# Patient Record
Sex: FEMALE | Race: BLACK OR AFRICAN AMERICAN | NOT HISPANIC OR LATINO | Employment: UNEMPLOYED | URBAN - METROPOLITAN AREA
[De-identification: names, ages, dates, MRNs, and addresses within clinical notes are randomized per-mention and may not be internally consistent; named-entity substitution may affect disease eponyms.]

---

## 2024-04-23 ENCOUNTER — HOSPITAL ENCOUNTER (EMERGENCY)
Facility: HOSPITAL | Age: 54
Discharge: HOME | End: 2024-04-23
Attending: EMERGENCY MEDICINE
Payer: MEDICAID

## 2024-04-23 VITALS
HEART RATE: 64 BPM | WEIGHT: 240 LBS | SYSTOLIC BLOOD PRESSURE: 136 MMHG | DIASTOLIC BLOOD PRESSURE: 96 MMHG | OXYGEN SATURATION: 99 % | RESPIRATION RATE: 16 BRPM | TEMPERATURE: 99 F | BODY MASS INDEX: 42.52 KG/M2 | HEIGHT: 63 IN

## 2024-04-23 DIAGNOSIS — M51.36 DDD (DEGENERATIVE DISC DISEASE), LUMBAR: ICD-10-CM

## 2024-04-23 DIAGNOSIS — M54.16 LUMBAR RADICULOPATHY: Primary | ICD-10-CM

## 2024-04-23 DIAGNOSIS — R03.0 ELEVATED BLOOD PRESSURE READING: ICD-10-CM

## 2024-04-23 LAB
ALBUMIN SERPL-MCNC: 3.5 G/DL (ref 3.5–5)
ALBUMIN/GLOB SERPL: 0.7 RATIO (ref 1.1–2)
ALP SERPL-CCNC: 96 UNIT/L (ref 40–150)
ALT SERPL-CCNC: 12 UNIT/L (ref 0–55)
APPEARANCE UR: CLEAR
AST SERPL-CCNC: 18 UNIT/L (ref 5–34)
BACTERIA #/AREA URNS AUTO: ABNORMAL /HPF
BASOPHILS # BLD AUTO: 0.04 X10(3)/MCL
BASOPHILS NFR BLD AUTO: 0.6 %
BILIRUB SERPL-MCNC: 0.2 MG/DL
BILIRUB UR QL STRIP.AUTO: NEGATIVE
BUN SERPL-MCNC: 12.3 MG/DL (ref 9.8–20.1)
CALCIUM SERPL-MCNC: 9.5 MG/DL (ref 8.4–10.2)
CHLORIDE SERPL-SCNC: 108 MMOL/L (ref 98–107)
CO2 SERPL-SCNC: 28 MMOL/L (ref 22–29)
COLOR UR AUTO: YELLOW
CREAT SERPL-MCNC: 0.69 MG/DL (ref 0.55–1.02)
EOSINOPHIL # BLD AUTO: 0.08 X10(3)/MCL (ref 0–0.9)
EOSINOPHIL NFR BLD AUTO: 1.2 %
ERYTHROCYTE [DISTWIDTH] IN BLOOD BY AUTOMATED COUNT: 20.4 % (ref 11.5–17)
GFR SERPLBLD CREATININE-BSD FMLA CKD-EPI: >60 MLS/MIN/1.73/M2
GLOBULIN SER-MCNC: 4.7 GM/DL (ref 2.4–3.5)
GLUCOSE SERPL-MCNC: 91 MG/DL (ref 74–100)
GLUCOSE UR QL STRIP.AUTO: NORMAL
HCT VFR BLD AUTO: 35.9 % (ref 37–47)
HGB BLD-MCNC: 10 G/DL (ref 12–16)
IMM GRANULOCYTES # BLD AUTO: 0.02 X10(3)/MCL (ref 0–0.04)
IMM GRANULOCYTES NFR BLD AUTO: 0.3 %
KETONES UR QL STRIP.AUTO: NEGATIVE
LEUKOCYTE ESTERASE UR QL STRIP.AUTO: NEGATIVE
LYMPHOCYTES # BLD AUTO: 2.07 X10(3)/MCL (ref 0.6–4.6)
LYMPHOCYTES NFR BLD AUTO: 30.6 %
MCH RBC QN AUTO: 20.7 PG (ref 27–31)
MCHC RBC AUTO-ENTMCNC: 27.9 G/DL (ref 33–36)
MCV RBC AUTO: 74.2 FL (ref 80–94)
MONOCYTES # BLD AUTO: 0.5 X10(3)/MCL (ref 0.1–1.3)
MONOCYTES NFR BLD AUTO: 7.4 %
MUCOUS THREADS URNS QL MICRO: ABNORMAL /LPF
NEUTROPHILS # BLD AUTO: 4.05 X10(3)/MCL (ref 2.1–9.2)
NEUTROPHILS NFR BLD AUTO: 59.9 %
NITRITE UR QL STRIP.AUTO: NEGATIVE
NRBC BLD AUTO-RTO: 0 %
PH UR STRIP.AUTO: 6.5 [PH]
PLATELET # BLD AUTO: 356 X10(3)/MCL (ref 130–400)
PMV BLD AUTO: 9.8 FL (ref 7.4–10.4)
POTASSIUM SERPL-SCNC: 3.7 MMOL/L (ref 3.5–5.1)
PROT SERPL-MCNC: 8.2 GM/DL (ref 6.4–8.3)
PROT UR QL STRIP.AUTO: ABNORMAL
RBC # BLD AUTO: 4.84 X10(6)/MCL (ref 4.2–5.4)
RBC #/AREA URNS AUTO: ABNORMAL /HPF
RBC UR QL AUTO: NEGATIVE
SODIUM SERPL-SCNC: 145 MMOL/L (ref 136–145)
SP GR UR STRIP.AUTO: 1.02 (ref 1–1.03)
SQUAMOUS #/AREA URNS LPF: ABNORMAL /HPF
UROBILINOGEN UR STRIP-ACNC: NORMAL
WBC # SPEC AUTO: 6.76 X10(3)/MCL (ref 4.5–11.5)
WBC #/AREA URNS AUTO: ABNORMAL /HPF

## 2024-04-23 PROCEDURE — 85025 COMPLETE CBC W/AUTO DIFF WBC: CPT | Performed by: NURSE PRACTITIONER

## 2024-04-23 PROCEDURE — 96372 THER/PROPH/DIAG INJ SC/IM: CPT

## 2024-04-23 PROCEDURE — 81001 URINALYSIS AUTO W/SCOPE: CPT | Performed by: NURSE PRACTITIONER

## 2024-04-23 PROCEDURE — 80053 COMPREHEN METABOLIC PANEL: CPT | Performed by: NURSE PRACTITIONER

## 2024-04-23 PROCEDURE — 99285 EMERGENCY DEPT VISIT HI MDM: CPT | Mod: 25

## 2024-04-23 PROCEDURE — 63600175 PHARM REV CODE 636 W HCPCS

## 2024-04-23 PROCEDURE — 25000003 PHARM REV CODE 250

## 2024-04-23 RX ORDER — TRAMADOL HYDROCHLORIDE 50 MG/1
50 TABLET ORAL EVERY 6 HOURS PRN
Qty: 12 TABLET | Refills: 0 | Status: SHIPPED | OUTPATIENT
Start: 2024-04-23 | End: 2024-04-26

## 2024-04-23 RX ORDER — AMLODIPINE BESYLATE 5 MG/1
5 TABLET ORAL DAILY
Qty: 30 TABLET | Refills: 0 | Status: SHIPPED | OUTPATIENT
Start: 2024-04-23 | End: 2024-05-23

## 2024-04-23 RX ORDER — CLONIDINE HYDROCHLORIDE 0.1 MG/1
0.1 TABLET ORAL
Status: COMPLETED | OUTPATIENT
Start: 2024-04-23 | End: 2024-04-23

## 2024-04-23 RX ORDER — HYDROCODONE BITARTRATE AND ACETAMINOPHEN 5; 325 MG/1; MG/1
1 TABLET ORAL
Status: COMPLETED | OUTPATIENT
Start: 2024-04-23 | End: 2024-04-23

## 2024-04-23 RX ORDER — DEXAMETHASONE SODIUM PHOSPHATE 4 MG/ML
8 INJECTION, SOLUTION INTRA-ARTICULAR; INTRALESIONAL; INTRAMUSCULAR; INTRAVENOUS; SOFT TISSUE
Status: COMPLETED | OUTPATIENT
Start: 2024-04-23 | End: 2024-04-23

## 2024-04-23 RX ADMIN — HYDROCODONE BITARTRATE AND ACETAMINOPHEN 1 TABLET: 5; 325 TABLET ORAL at 11:04

## 2024-04-23 RX ADMIN — DEXAMETHASONE SODIUM PHOSPHATE 8 MG: 4 INJECTION, SOLUTION INTRA-ARTICULAR; INTRALESIONAL; INTRAMUSCULAR; INTRAVENOUS; SOFT TISSUE at 11:04

## 2024-04-23 RX ADMIN — CLONIDINE HYDROCHLORIDE 0.1 MG: 0.1 TABLET ORAL at 11:04

## 2024-04-23 NOTE — ED PROVIDER NOTES
Encounter Date: 4/23/2024       History     Chief Complaint   Patient presents with    Leg Pain     Ambulatory to triage. Reports right sided lower back pain radiating to right buttock and down leg. States burning 9/10. Denies trauma or injury. Also reports urinary frequency x 3 weeks, denies dysuria/fever. States no PCP, no PMH of sciatica or HTN. Not on meds for anything per pt. Denies headache, blurred vision, dizziness.     The patient is a 54 y.o. female who presents to the Emergency Department with a chief complaint of right lower back pain. Symptoms began 3 weeks ago and have been constant since onset. Her  pain is currently rated as a 8/10 in severity and described as sharp with radiation down right leg. Associated symptoms include nothing. Symptoms are aggravated with ambulation and there are no alleviating factors. The patient denies saddle anesthesia, bowel/bladder incontinence, or extremity weakness. She reports taking nothing prior to arrival with no relief of symptoms. No other reported symptoms at this time.      The history is provided by the patient. No  was used.   Back Pain   This is a new problem. The current episode started several weeks ago. The problem occurs daily. The problem has been unchanged. The pain is associated with no known injury. The pain is present in the lumbar spine. The quality of the pain is described as aching. The pain radiates to the right leg. The pain is at a severity of 8/10. The pain is The same all the time. Pertinent negatives include no chest pain, no fever, no numbness, no weight loss, no headaches, no abdominal pain, no abdominal swelling, no bowel incontinence, no perianal numbness, no bladder incontinence, no dysuria, no pelvic pain, no leg pain, no paresthesias, no paresis, no tingling and no weakness. She has tried nothing for the symptoms. The treatment provided no relief.     Review of patient's allergies indicates:  No Known Allergies  No  past medical history on file.  No past surgical history on file.  No family history on file.  Social History     Tobacco Use    Smoking status: Never    Smokeless tobacco: Never     Review of Systems   Constitutional:  Negative for chills, fever and weight loss.   HENT:  Negative for sore throat.    Respiratory:  Negative for shortness of breath.    Cardiovascular:  Negative for chest pain.   Gastrointestinal:  Negative for abdominal pain, bowel incontinence and nausea.   Genitourinary:  Negative for bladder incontinence, dysuria and pelvic pain.   Musculoskeletal:  Positive for arthralgias and back pain.   Skin:  Negative for rash.   Neurological:  Negative for tingling, weakness, numbness, headaches and paresthesias.   Hematological:  Does not bruise/bleed easily.   All other systems reviewed and are negative.      Physical Exam     Initial Vitals [04/23/24 0908]   BP Pulse Resp Temp SpO2   (!) 205/103 81 18 98.6 °F (37 °C) 100 %      MAP       --         Physical Exam    Nursing note and vitals reviewed.  Constitutional: She appears well-developed and well-nourished.   HENT:   Head: Normocephalic.   Right Ear: Hearing and tympanic membrane normal.   Left Ear: Hearing and tympanic membrane normal.   Mouth/Throat: Uvula is midline, oropharynx is clear and moist and mucous membranes are normal.   Eyes: Conjunctivae and EOM are normal. Pupils are equal, round, and reactive to light.   Cardiovascular:  Normal rate, regular rhythm, normal heart sounds and normal pulses.           Pulmonary/Chest: Effort normal and breath sounds normal.   Abdominal: Abdomen is soft. Bowel sounds are normal. There is no abdominal tenderness.   Musculoskeletal:      Lumbar back: Tenderness present. Positive right straight leg raise test. Negative left straight leg raise test.        Legs:      Lymphadenopathy:     She has no cervical adenopathy.   Neurological: She is alert. GCS eye subscore is 4. GCS verbal subscore is 5. GCS motor  subscore is 6.   Skin: Skin is warm, dry and intact. Capillary refill takes less than 2 seconds.         ED Course   Procedures  Labs Reviewed   URINALYSIS, REFLEX TO URINE CULTURE - Abnormal; Notable for the following components:       Result Value    Protein, UA 1+ (*)     Mucous, UA Trace (*)     All other components within normal limits   COMPREHENSIVE METABOLIC PANEL - Abnormal; Notable for the following components:    Chloride 108 (*)     Globulin 4.7 (*)     Albumin/Globulin Ratio 0.7 (*)     All other components within normal limits   CBC WITH DIFFERENTIAL - Abnormal; Notable for the following components:    Hgb 10.0 (*)     Hct 35.9 (*)     MCV 74.2 (*)     MCH 20.7 (*)     MCHC 27.9 (*)     RDW 20.4 (*)     All other components within normal limits   CBC W/ AUTO DIFFERENTIAL    Narrative:     The following orders were created for panel order CBC Auto Differential.  Procedure                               Abnormality         Status                     ---------                               -----------         ------                     CBC with Differential[[id number]]       Abnormal            Final result                 Please view results for these tests on the individual orders.          Imaging Results              CT Lumbar Spine Without Contrast (Final result)  Result time 04/23/24 11:58:31      Final result by Samira Montenegro MD (04/23/24 11:58:31)                   Impression:      1. No acute abnormality identified.  2. Multilevel degenerative changes of the lumbar spine with canal stenosis at L4-5.      Electronically signed by: Samira Montenegro  Date:    04/23/2024  Time:    11:58               Narrative:    EXAMINATION:  CT LUMBAR SPINE WITHOUT CONTRAST    CLINICAL HISTORY:  Lumbar radiculopathy, symptoms persist with conservative treatment;    TECHNIQUE:  Noncontrast CT images of the lumbar spine. Axial, coronal, and sagittal reformatted images were obtained. Dose length product is 869  mGycm. Automatic exposure control, adjustment of mA/kV or iterative reconstruction technique was used to limit radiation dose.    COMPARISON:  None    FINDINGS:  There are 5 non-rib-bearing lumbar type vertebral bodies.  There is grade 1 anterolisthesis of L4 over L5.  The vertebral body heights are maintained.  There is mild disc height loss at L4-5 small multilevel marginal osteophytes.  There is moderate to severe facet arthropathy lower lumbar spine.  There is likely severe canal narrowing at L4-L5 secondary to disc bulge and facet hypertrophy.  There is severe bilateral neural foraminal narrowing at L4-5 and on the left at L5-S1.  There is no paraspinal hematoma.                                       Medications   dexAMETHasone injection 8 mg (8 mg Intramuscular Given 4/23/24 1130)   HYDROcodone-acetaminophen 5-325 mg per tablet 1 tablet (1 tablet Oral Given 4/23/24 1130)   cloNIDine tablet 0.1 mg (0.1 mg Oral Given 4/23/24 1130)     Medical Decision Making  The patient is a 54 y.o. female who presents to the Emergency Department with a chief complaint of right lower back pain. Symptoms began 3 weeks ago and have been constant since onset. Her  pain is currently rated as a 8/10 in severity and described as sharp with radiation down right leg. Associated symptoms include nothing. Symptoms are aggravated with ambulation and there are no alleviating factors. The patient denies saddle anesthesia, bowel/bladder incontinence, or extremity weakness. She reports taking nothing prior to arrival with no relief of symptoms. No other reported symptoms at this time.      Differential diagnosis include but are not limited to lumbar radiculopathy, sciatica, lumbar DDD    Problems Addressed:  DDD (degenerative disc disease), lumbar: acute illness or injury  Elevated blood pressure reading: acute illness or injury  Lumbar radiculopathy: acute illness or injury    Amount and/or Complexity of Data Reviewed  Radiology: ordered.  Decision-making details documented in ED Course.    Risk  Prescription drug management.               ED Course as of 04/23/24 1306   Tue Apr 23, 2024   1230 Patient was hypertensive on arrival. She denied chest pain, SOB, headache, or dizziness. Her blood pressure has improved after clonidine. I discussed this in detail with patient. [LM]   1241 CT Lumbar Spine Without Contrast  Impression:     1. No acute abnormality identified.  2. Multilevel degenerative changes of the lumbar spine with canal stenosis at L4-5.   [LM]   1251 I discussed results in detail with the patient including follow up.  Patient is amenable with the plan and ready for discharge home.  She reports significant improvement of her pain.  She was able to ambulate without difficulty.  I did call and schedule patient an appointment with [facility] for this Thursday.  I provided patient with details for the appointment and encouraged her to make this appointment. [LM]      ED Course User Index  [LM] Danial Merritt NP                           Clinical Impression:  Final diagnoses:  [M54.16] Lumbar radiculopathy (Primary)  [M51.36] DDD (degenerative disc disease), lumbar  [R03.0] Elevated blood pressure reading          ED Disposition Condition    Discharge Stable          ED Prescriptions       Medication Sig Dispense Start Date End Date Auth. Provider    traMADoL (ULTRAM) 50 mg tablet Take 1 tablet (50 mg total) by mouth every 6 (six) hours as needed for Pain. 12 tablet 4/23/2024 4/26/2024 Danial Merritt NP    amLODIPine (NORVASC) 5 MG tablet Take 1 tablet (5 mg total) by mouth once daily. 30 tablet 4/23/2024 5/23/2024 Danial Merritt NP          Follow-up Information       Follow up With Specialties Details Why Contact Info    Carlita Escudero, [facility] on 4/25/2024 Appointment at 10:20am (Entrance 1) [address] [facility] [id number] [phone]               Danial Merritt NP  04/23/24  3115

## 2024-10-15 DIAGNOSIS — Z12.4 ENCOUNTER FOR SCREENING FOR CERVICAL CANCER: Primary | ICD-10-CM

## 2024-11-25 ENCOUNTER — TELEPHONE (OUTPATIENT)
Dept: GYNECOLOGY | Facility: CLINIC | Age: 54
End: 2024-11-25
Payer: MEDICAID

## 2024-11-26 ENCOUNTER — OFFICE VISIT (OUTPATIENT)
Dept: GYNECOLOGY | Facility: CLINIC | Age: 54
End: 2024-11-26
Payer: MEDICAID

## 2024-11-26 VITALS
HEART RATE: 91 BPM | RESPIRATION RATE: 18 BRPM | DIASTOLIC BLOOD PRESSURE: 78 MMHG | OXYGEN SATURATION: 100 % | BODY MASS INDEX: 43.98 KG/M2 | HEIGHT: 63 IN | WEIGHT: 248.19 LBS | TEMPERATURE: 98 F | SYSTOLIC BLOOD PRESSURE: 138 MMHG

## 2024-11-26 DIAGNOSIS — L30.4 INTERTRIGO: ICD-10-CM

## 2024-11-26 DIAGNOSIS — Z12.4 ENCOUNTER FOR SCREENING FOR CERVICAL CANCER: ICD-10-CM

## 2024-11-26 DIAGNOSIS — Z12.4 ENCOUNTER FOR PAPANICOLAOU SMEAR FOR CERVICAL CANCER SCREENING: Primary | ICD-10-CM

## 2024-11-26 DIAGNOSIS — Z12.31 ENCOUNTER FOR SCREENING MAMMOGRAM FOR BREAST CANCER: ICD-10-CM

## 2024-11-26 PROCEDURE — 3008F BODY MASS INDEX DOCD: CPT | Mod: CPTII,,,

## 2024-11-26 PROCEDURE — 99214 OFFICE O/P EST MOD 30 MIN: CPT | Mod: PBBFAC

## 2024-11-26 PROCEDURE — 3075F SYST BP GE 130 - 139MM HG: CPT | Mod: CPTII,,,

## 2024-11-26 PROCEDURE — 1159F MED LIST DOCD IN RCRD: CPT | Mod: CPTII,,,

## 2024-11-26 PROCEDURE — 3078F DIAST BP <80 MM HG: CPT | Mod: CPTII,,,

## 2024-11-26 PROCEDURE — 99386 PREV VISIT NEW AGE 40-64: CPT | Mod: S$PBB,,,

## 2024-11-26 RX ORDER — SYRING-NEEDL,DISP,INSUL,0.3 ML 29 G X1/2"
SYRINGE, EMPTY DISPOSABLE MISCELLANEOUS
COMMUNITY
Start: 2024-10-29

## 2024-11-26 RX ORDER — MELOXICAM 7.5 MG/1
7.5 TABLET ORAL
COMMUNITY
Start: 2024-10-11

## 2024-11-26 RX ORDER — NYSTATIN AND TRIAMCINOLONE ACETONIDE 100000; 1 [USP'U]/G; MG/G
CREAM TOPICAL 2 TIMES DAILY
Qty: 60 G | Refills: 3 | Status: SHIPPED | OUTPATIENT
Start: 2024-11-26

## 2024-11-26 NOTE — PROGRESS NOTES
[facility] -  Gynecology / Women's Health Clinic     Subjective:      Patient ID: Faith Geller is a 54 y.o. female.    Chief Complaint:  Gynecologic Exam      History of Present Illness:  The patient  here for annual exam. Her LMP was 2024, pt Perimenopausal. Denies heavy cycles. Denies history of abnormal paps. Denies breast or urinary complaints. Denies pelvic pain, abnormal bleeding or discharge. Pt reports no STIs in the past and no concerns. Admits dorian groin and under abdomen skin fold itching with redness and moisture. Denies contraception. Denies tobacco use. Dep. screening 0. Denies fly hx of ovarian or uterine cancer. Paternal great aunt with breast cancer, mother with colon cancer. Colonoscopy scheduled per patient.    GYN & OB History:  No LMP recorded (lmp unknown). Patient is postmenopausal.     OB History    Para Term  AB Living   2 2 2         SAB IAB Ectopic Multiple Live Births                  # Outcome Date GA Lbr Arcadio/2nd Weight Sex Type Anes PTL Lv   2 Term      CS-LTranv      1 Term      CS-LTranv          History reviewed. No pertinent past medical history.     Past Surgical History:   Procedure Laterality Date     SECTION          Social History     Tobacco Use    Smoking status: Never    Smokeless tobacco: Never   Substance and Sexual Activity    Alcohol use: Never    Drug use: Not on file    Sexual activity: Yes        Current Outpatient Medications   Medication Instructions    amLODIPine (NORVASC) 5 mg, Oral, Daily    magnesium citrate solution SMARTSI Bottle(s) By Mouth Every Evening    meloxicam (MOBIC) 7.5 mg    nystatin-triamcinolone (MYCOLOG II) cream Topical (Top), 2 times daily, To external vaginal area       Review of patient's allergies indicates:  No Known Allergies      Review of Systems:  Review of Systems  Negative except for pertinent findings for positives per HPI.     Objective:     Physical Exam   Visit Vitals  BP  "138/78 (Patient Position: Sitting)   Pulse 91   Temp 98 °F (36.7 °C) (Oral)   Resp 18   Ht 5' 3" (1.6 m)   Wt 112.6 kg (248 lb 3.2 oz)   LMP  (LMP Unknown)   SpO2 100%   BMI 43.97 kg/m²       GENERAL: Well-developed female. No acute distress.    SKIN: Warm and intact. Erythema with moisture noted to dorian groin skin fold to abdomen.  BREASTS: No rashes or erythema. No masses, lumps, discharge, tenderness.  VULVA: General appearance normal; external genitalia with no lesions.  VAGINA: Mucosa/vaginal vault pink, no abnormal discharge or lesions.  CERVIX: Pink, parous appearing os, no erythema or abnormal discharge.  BIMANUAL EXAM: reveals a 8 week-sized uterus. The uterus is non tender. Bilateral adnexa reveal no tenderness.  PSYCHIATRIC: Patient is oriented to person, place, and time. Mood and affect are normal.    Assessment:       ICD-10-CM ICD-9-CM   1. Encounter for Papanicolaou smear for cervical cancer screening  Z12.4 V76.2   2. Encounter for screening for cervical cancer  Z12.4 V76.2   3. Encounter for screening mammogram for breast cancer  Z12.31 V76.12   4. Intertrigo  L30.4 695.89       Plan:     1. Encounter for Papanicolaou smear for cervical cancer screening  -     Liquid-Based Pap Smear, Screening    2. Encounter for screening for cervical cancer  -     Ambulatory referral/consult to Gynecology    3. Encounter for screening mammogram for breast cancer  -     Mammo Digital Screening Bilat w/ Marcelino; Future; Expected date: 11/26/2024    4. Intertrigo  -     nystatin-triamcinolone (MYCOLOG II) cream; Apply topically 2 (two) times daily. To external vaginal area  Dispense: 60 g; Refill: 3    Pap today  MG ordered    Shower BID with warm (not hot) water. Use Dove unscented soap, no scented soaps. No scented washing powders.  Keep area clean and dry, consider hair dryer on cool setting, no hot baths. Keep area uncovered when applicable to provide aeration. Tight glucose control.  Cream prescribed for dorian groin " and under skin fold to abdomen    Call with any GYN concerns  Follow up in about 1 year (around 11/26/2025) for Annual.